# Patient Record
Sex: FEMALE | Race: WHITE | NOT HISPANIC OR LATINO | Employment: OTHER | URBAN - METROPOLITAN AREA
[De-identification: names, ages, dates, MRNs, and addresses within clinical notes are randomized per-mention and may not be internally consistent; named-entity substitution may affect disease eponyms.]

---

## 2021-12-21 NOTE — PATIENT DISCUSSION
Per moderate c/d ratio, OD>OS. IOP wnl, 16/16. (-) family hx. OCT (RNFL) done today, wnl. Will monitor annually.

## 2021-12-21 NOTE — PATIENT DISCUSSION
PCO ([address] [place] [address]): Visually significant PCO present on exam today. Recommend YAG laser capsulotomy to improve vision and decrease glare symptoms. RBAs of procedure discussed. Patient agrees and wishes to proceed.

## 2021-12-23 NOTE — PATIENT DISCUSSION
PCO ([address] [place] [address]): Visually significant PCO present on exam today. Recommend YAG laser capsulotomy to improve vision and decrease glare symptoms. RBAs of procedure discussed. Patient agrees and wishes to proceed.

## 2021-12-23 NOTE — PROCEDURE NOTE: CLINICAL
PROCEDURE NOTE: YAG Capsulotomy OD. Diagnosis: Posterior Capsular Opacification (PCO). Prep: Mydriacil 1% and Phenylephrine 2.5%. Prior to treatment, the risks/benefits/alternatives were discussed. The patient wished to proceed with procedure. Consent was signed. Proparacaine and brominidine were placed into the operative eye after the eye was dilated. Power = 1.2mJ. Number of pulses = 95. Patient tolerated procedure well and there were no complications. Post Laser instructions given. Nabil Cardozo

## 2022-01-20 NOTE — PATIENT DISCUSSION
Per moderate c/d ratio, OD>OS. IOP wnl, 16/16. (-) family hx. OCT RNFL (12/21/2021) wnl. Will monitor annually.

## 2023-04-26 ENCOUNTER — NEW PATIENT (OUTPATIENT)
Dept: URBAN - METROPOLITAN AREA CLINIC 35 | Facility: CLINIC | Age: 68
End: 2023-04-26

## 2023-04-26 VITALS — HEIGHT: 60 IN | WEIGHT: 155 LBS | BODY MASS INDEX: 30.43 KG/M2

## 2023-04-26 DIAGNOSIS — C44.1191: ICD-10-CM

## 2023-04-26 PROCEDURE — 99203 OFFICE O/P NEW LOW 30 MIN: CPT

## 2023-04-26 PROCEDURE — 92285 EXTERNAL OCULAR PHOTOGRAPHY: CPT

## 2023-04-26 ASSESSMENT — VISUAL ACUITY
OS_CC: 20/25+2
OD_CC: 20/25-1